# Patient Record
(demographics unavailable — no encounter records)

---

## 2024-10-07 NOTE — HISTORY OF PRESENT ILLNESS
[de-identified] : DOS: 8/15/2024.   [de-identified] : 7 weeks 4 days s/p left index and small trigger finger decompressions. He's still going for PT. patient has started lifting weight 20 to 30 pounds [de-identified] : Incisions well-healed barely visible.  Excellent range of motion.  Mild swelling [de-identified] : 7 weeks 4 days s/p left index and small trigger finger decompressions.  [de-identified] : Patient doing well.  Can continue with a home program and I can see him back as needed

## 2024-10-07 NOTE — HISTORY OF PRESENT ILLNESS
[de-identified] : DOS: 8/15/2024.   [de-identified] : 7 weeks 4 days s/p left index and small trigger finger decompressions. He's still going for PT. patient has started lifting weight 20 to 30 pounds [de-identified] : Incisions well-healed barely visible.  Excellent range of motion.  Mild swelling [de-identified] : 7 weeks 4 days s/p left index and small trigger finger decompressions.  [de-identified] : Patient doing well.  Can continue with a home program and I can see him back as needed

## 2024-11-12 NOTE — HISTORY OF PRESENT ILLNESS
[de-identified] : DOS: 8/15/2024. [de-identified] : 3 months s/p left index and small trigger finger decompressions. He reports feeling clicking of the right small finger which started 2 weeks ago.  [de-identified] : Triggering right little finger.  Tender at A1 pulley excellent range of motion left index and small fingers [de-identified] : 3 months s/p left index and small trigger finger decompressions. 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  I recommended that the patient undergo a trigger finger injection. The risks, benefits, and alternatives were discussed with the patient in detail. This included (but was not limited to) pain, infection, subcutaneous atrophy, skin depigmentation, elevated glucose, etc...  The patient agreed to undergo the steroid injection. Under informed consent and sterile conditions, 1/2 cc of 2% plain lidocaine  and 1/2 cc of Kenalog 10 was precisely injected into the patient's finger.   A sterile Band-Aid was placed.  It is my hope that this significantly alleviates the patient's symptoms.

## 2024-11-12 NOTE — HISTORY OF PRESENT ILLNESS
[de-identified] : DOS: 8/15/2024. [de-identified] : 3 months s/p left index and small trigger finger decompressions. He reports feeling clicking of the right small finger which started 2 weeks ago.  [de-identified] : Triggering right little finger.  Tender at A1 pulley excellent range of motion left index and small fingers [de-identified] : 3 months s/p left index and small trigger finger decompressions. 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  I recommended that the patient undergo a trigger finger injection. The risks, benefits, and alternatives were discussed with the patient in detail. This included (but was not limited to) pain, infection, subcutaneous atrophy, skin depigmentation, elevated glucose, etc...  The patient agreed to undergo the steroid injection. Under informed consent and sterile conditions, 1/2 cc of 2% plain lidocaine  and 1/2 cc of Kenalog 10 was precisely injected into the patient's finger.   A sterile Band-Aid was placed.  It is my hope that this significantly alleviates the patient's symptoms.

## 2025-06-10 NOTE — PHYSICAL EXAM
[de-identified] : PA lateral oblique x-rays of both hands for comparison did not show any bony abnormality or acute abnormality with the right small finger.  No advancement of Kienbock's disease [de-identified] : Tender A1 pulley right small finger with triggering and locking.  No other palpable trigger digits.  Nontender over left dorsal lunate.  Wrist extension 70 wrist flexion 40

## 2025-06-10 NOTE — HISTORY OF PRESENT ILLNESS
[FreeTextEntry1] : Patient presents for follow-up of recurrent right small trigger finger.  Patient received right small finger trigger finger injection on November 12, 2024.  Patient is considering surgery.  Patient also presents for follow-up evaluation s/p left wrist arthroscopy with central tear TFCC complex debridement, arthroscopy with debridement of chondral flap and ulnar-sided synovitis, left distal radius open core decompression. DOS: 8/3/2023.  He would like to take an x-ray today.  s/p left index and small trigger finger decompressions.  Date of surgery August 15, 2024. s/p left ring and long trigger finger release. DOS:11/21/2018.

## 2025-06-10 NOTE — PHYSICAL EXAM
[de-identified] : PA lateral oblique x-rays of both hands for comparison did not show any bony abnormality or acute abnormality with the right small finger.  No advancement of Kienbock's disease [de-identified] : Tender A1 pulley right small finger with triggering and locking.  No other palpable trigger digits.  Nontender over left dorsal lunate.  Wrist extension 70 wrist flexion 40

## 2025-06-10 NOTE — ASSESSMENT
[FreeTextEntry1] : Patient has failed conservative treatment of right small finger trigger finger.  We discussed a second injection versus surgery.  Since he has failed conservative treatment of multiple trigger digits he would like to proceed with right small finger trigger finger decompression  The risks benefits and alternatives were discussed with the patient including, but not limited to:   infection, nerve injury, need for FDS slip excision, CRPS, anesthetic block injury, persistent symptoms, scar sensitivity, PIP contracture, pulley injury, recurrence etc.  The patient would like to proceed with surgery.  Shared decision-making was undertaken  As far as Kienbock's disease x-rays demonstrate revascularization of the lunate without progression which is wonderful and is not having symptoms after a core decompression

## 2025-07-03 NOTE — HISTORY OF PRESENT ILLNESS
[Clean/Dry/Intact] : clean, dry and intact [Healed] : healed [de-identified] : DOS 6/25/25 [de-identified] : 8 days s/p right small finger trigger release [de-identified] : sutures intact at time of office visit, incision site healing well, cap refill <2, NV grossly intact, full ROM of digit in both flexion and extension with some stiffness at the extremes of flexion consistent with postoperative status [de-identified] : 8 days s/p right small finger trigger release [de-identified] : Encouraged continued ROM of digit to decrease swelling and stiffness, home exercises provided in office today in addition to a hand therapy RX, scar management techniques demonstrated in office, sutures discontinued without complication and steri strips applied, RTO in 6 weeks to see Dr. Sparrow

## 2025-07-03 NOTE — HISTORY OF PRESENT ILLNESS
[Clean/Dry/Intact] : clean, dry and intact [Healed] : healed [de-identified] : DOS 6/25/25 [de-identified] : 8 days s/p right small finger trigger release [de-identified] : sutures intact at time of office visit, incision site healing well, cap refill <2, NV grossly intact, full ROM of digit in both flexion and extension with some stiffness at the extremes of flexion consistent with postoperative status [de-identified] : 8 days s/p right small finger trigger release [de-identified] : Encouraged continued ROM of digit to decrease swelling and stiffness, home exercises provided in office today in addition to a hand therapy RX, scar management techniques demonstrated in office, sutures discontinued without complication and steri strips applied, RTO in 6 weeks to see Dr. Sparrow